# Patient Record
Sex: FEMALE | Race: OTHER | URBAN - METROPOLITAN AREA
[De-identification: names, ages, dates, MRNs, and addresses within clinical notes are randomized per-mention and may not be internally consistent; named-entity substitution may affect disease eponyms.]

---

## 2022-03-24 NOTE — PATIENT DISCUSSION
Recommended warm compresses twice daily and Lid scrubs.  Start on Maxitrol lynda qhs OS.  RTC 1 week sooner if s/s worsen or does not improve.

## 2022-03-30 NOTE — PATIENT DISCUSSION
Recommended warm compresses twice daily and Lid scrubs.  Start on Maxitrol lynda qhs OS.  Start on Z- pack as directed.  RTC 1 week sooner if s/s worsen or does not improve.

## 2022-12-12 ENCOUNTER — CONSULTATION/EVALUATION (OUTPATIENT)
Dept: URBAN - METROPOLITAN AREA CLINIC 20 | Facility: CLINIC | Age: 47
End: 2022-12-12

## 2022-12-12 PROCEDURE — 99499LK REFRACTIVE CONSULT/LASIK

## 2022-12-12 PROCEDURE — 76514 ECHO EXAM OF EYE THICKNESS: CPT

## 2022-12-12 PROCEDURE — 92025 CPTRIZED CORNEAL TOPOGRAPHY: CPT

## 2022-12-12 ASSESSMENT — PACHYMETRY
OD_CT_UM: 579
OS_CT_UM: 577

## 2022-12-12 ASSESSMENT — VISUAL ACUITY
OD_SC: 20/20
OU_CC: 20/20
OS_SC: 20/400
OU_SC: 20/400
OU_CC: 20/25
OS_CC: 20/25
OS_SC: 20/20
OD_CC: 20/30
OS_CC: 20/25
OD_CC: 20/20
OD_SC: 20/400
OU_SC: 20/20

## 2022-12-12 ASSESSMENT — KERATOMETRY
OS_AXISANGLE_DEGREES: 104
OD_K2POWER_DIOPTERS: 47.25
OS_AXISANGLE_DEGREES: 099
OD_AXISANGLE_DEGREES: 021
OS_AXISANGLE2_DEGREES: 14
OD_AXISANGLE_DEGREES: 001
OD_K1POWER_DIOPTERS: 46.75
OS_K2POWER_DIOPTERS: 47.75
OD_K1POWER_DIOPTERS: 47.25
OD_AXISANGLE2_DEGREES: 91
OS_AXISANGLE_DEGREES: 098
OD_AXISANGLE2_DEGREES: 111
OS_AXISANGLE2_DEGREES: 9
OS_K2POWER_DIOPTERS: 47.50
OD_K2POWER_DIOPTERS: 47.00
OS_K1POWER_DIOPTERS: 46.75
OS_K1POWER_DIOPTERS: 46.50
OS_AXISANGLE2_DEGREES: 8

## 2022-12-12 ASSESSMENT — TONOMETRY
OD_IOP_MMHG: 18
OS_IOP_MMHG: 15

## 2023-01-05 ENCOUNTER — POST-OP (OUTPATIENT)
Dept: URBAN - METROPOLITAN AREA CLINIC 20 | Facility: CLINIC | Age: 48
End: 2023-01-05

## 2023-01-05 DIAGNOSIS — Z98.890: ICD-10-CM

## 2023-01-05 PROCEDURE — 99024 POSTOP FOLLOW-UP VISIT: CPT

## 2023-01-05 ASSESSMENT — VISUAL ACUITY: OD_SC: 20/20

## 2023-01-05 ASSESSMENT — TONOMETRY
OS_IOP_MMHG: 16
OD_IOP_MMHG: 16

## 2023-01-12 ENCOUNTER — POST-OP (OUTPATIENT)
Dept: URBAN - METROPOLITAN AREA CLINIC 20 | Facility: CLINIC | Age: 48
End: 2023-01-12

## 2023-01-12 DIAGNOSIS — Z98.890: ICD-10-CM

## 2023-01-12 PROCEDURE — 99024 POSTOP FOLLOW-UP VISIT: CPT

## 2023-01-12 ASSESSMENT — VISUAL ACUITY
OD_SC: 20/20
OS_SC: 20/40
OU_SC: 20/20

## 2023-01-12 ASSESSMENT — TONOMETRY
OS_IOP_MMHG: 17
OD_IOP_MMHG: 17

## 2023-01-26 ENCOUNTER — POST-OP (OUTPATIENT)
Dept: URBAN - METROPOLITAN AREA CLINIC 20 | Facility: CLINIC | Age: 48
End: 2023-01-26

## 2023-01-26 DIAGNOSIS — Z98.890: ICD-10-CM

## 2023-01-26 PROCEDURE — 99024 POSTOP FOLLOW-UP VISIT: CPT

## 2023-01-26 ASSESSMENT — VISUAL ACUITY
OS_SC: 20/30+2
OU_SC: 20/20
OU_SC: J5
OD_SC: 20/20

## 2023-01-26 ASSESSMENT — TONOMETRY
OD_IOP_MMHG: 13
OS_IOP_MMHG: 12

## 2023-05-05 ENCOUNTER — POST-OP (OUTPATIENT)
Dept: URBAN - METROPOLITAN AREA CLINIC 20 | Facility: CLINIC | Age: 48
End: 2023-05-05

## 2023-05-05 DIAGNOSIS — H26.493: ICD-10-CM

## 2023-05-05 PROCEDURE — 92014 COMPRE OPH EXAM EST PT 1/>: CPT

## 2023-05-05 PROCEDURE — 66821 AFTER CATARACT LASER SURGERY: CPT

## 2023-05-05 RX ORDER — PREDNISOLONE ACETATE 10 MG/ML: 1 SUSPENSION/ DROPS OPHTHALMIC

## 2023-05-05 ASSESSMENT — TONOMETRY
OS_IOP_MMHG: 13
OD_IOP_MMHG: 13

## 2023-05-05 ASSESSMENT — VISUAL ACUITY
OU_SC: 20/20
OD_SC: 20/20
OS_SC: 20/30-2

## 2023-05-22 ENCOUNTER — POST-OP (OUTPATIENT)
Dept: URBAN - METROPOLITAN AREA CLINIC 20 | Facility: CLINIC | Age: 48
End: 2023-05-22

## 2023-05-22 DIAGNOSIS — Z98.890: ICD-10-CM

## 2023-05-22 PROCEDURE — 99024 POSTOP FOLLOW-UP VISIT: CPT

## 2023-05-22 ASSESSMENT — VISUAL ACUITY
OD_SC: 20/20
OS_SC: 20/40

## 2023-05-22 ASSESSMENT — TONOMETRY
OS_IOP_MMHG: 16
OD_IOP_MMHG: 17

## 2023-05-31 ENCOUNTER — POST-OP (OUTPATIENT)
Dept: URBAN - METROPOLITAN AREA CLINIC 20 | Facility: CLINIC | Age: 48
End: 2023-05-31

## 2023-05-31 ASSESSMENT — VISUAL ACUITY
OU_SC: 20/20
OS_SC: 20/20
OS_SC: 20/60
OU_SC: 20/20
OD_SC: 20/20
OD_SC: 20/25

## 2023-06-30 ENCOUNTER — POST-OP (OUTPATIENT)
Dept: URBAN - METROPOLITAN AREA CLINIC 20 | Facility: CLINIC | Age: 48
End: 2023-06-30

## 2023-06-30 DIAGNOSIS — H52.222: ICD-10-CM

## 2023-06-30 PROCEDURE — 99211T TECH SERVICE

## 2023-06-30 ASSESSMENT — VISUAL ACUITY
OD_SC: 20/20
OU_SC: 20/20
OU_SC: 20/20
OD_SC: 20/20
OS_SC: 20/30
OS_SC: 20/30

## 2023-07-13 ENCOUNTER — POST-OP (OUTPATIENT)
Dept: URBAN - METROPOLITAN AREA CLINIC 20 | Facility: CLINIC | Age: 48
End: 2023-07-13

## 2023-07-13 DIAGNOSIS — Z98.890: ICD-10-CM

## 2023-07-13 PROCEDURE — 99024 POSTOP FOLLOW-UP VISIT: CPT

## 2023-07-13 ASSESSMENT — VISUAL ACUITY: OS_SC: 20/80

## 2023-07-17 ENCOUNTER — POST-OP (OUTPATIENT)
Dept: URBAN - METROPOLITAN AREA CLINIC 20 | Facility: CLINIC | Age: 48
End: 2023-07-17

## 2023-07-17 DIAGNOSIS — Z98.890: ICD-10-CM

## 2023-07-17 PROCEDURE — 99024 POSTOP FOLLOW-UP VISIT: CPT

## 2023-07-17 ASSESSMENT — VISUAL ACUITY
OD_PH: 20/25-1
OD_SC: 20/40

## 2023-07-24 ENCOUNTER — POST-OP (OUTPATIENT)
Dept: URBAN - METROPOLITAN AREA CLINIC 20 | Facility: CLINIC | Age: 48
End: 2023-07-24

## 2023-07-24 DIAGNOSIS — Z98.890: ICD-10-CM

## 2023-07-24 PROCEDURE — 99024LK POST OP LASIK CARE ONLY

## 2023-07-24 ASSESSMENT — VISUAL ACUITY
OD_SC: 20/30
OD_SC: 20/20
OS_SC: 20/25
OS_SC: 20/200

## 2023-07-28 ENCOUNTER — POST-OP (OUTPATIENT)
Dept: URBAN - METROPOLITAN AREA CLINIC 20 | Facility: CLINIC | Age: 48
End: 2023-07-28

## 2023-07-28 DIAGNOSIS — Z98.890: ICD-10-CM

## 2023-07-28 PROCEDURE — 99024 POSTOP FOLLOW-UP VISIT: CPT

## 2023-07-28 ASSESSMENT — VISUAL ACUITY
OS_SC: 20/40
OD_SC: 20/30
OS_PH: 20/40
OS_SC: 20/50
OD_SC: 20/20

## 2023-08-18 ENCOUNTER — POST-OP (OUTPATIENT)
Dept: URBAN - METROPOLITAN AREA CLINIC 20 | Facility: CLINIC | Age: 48
End: 2023-08-18

## 2023-08-18 DIAGNOSIS — Z98.890: ICD-10-CM

## 2023-08-18 PROCEDURE — 99024LK POST OP LASIK CARE ONLY

## 2023-08-18 RX ORDER — PREDNISOLONE ACETATE 10 MG/ML: 1 SUSPENSION/ DROPS OPHTHALMIC

## 2023-08-18 ASSESSMENT — VISUAL ACUITY
OD_SC: 20/20
OS_SC: 20/30-2

## 2023-09-15 ENCOUNTER — POST-OP (OUTPATIENT)
Dept: URBAN - METROPOLITAN AREA CLINIC 20 | Facility: CLINIC | Age: 48
End: 2023-09-15

## 2023-09-15 DIAGNOSIS — Z98.890: ICD-10-CM

## 2023-09-15 PROCEDURE — 99024LK POST OP LASIK CARE ONLY

## 2023-09-15 ASSESSMENT — VISUAL ACUITY
OD_SC: 20/20
OS_SC: 20/40

## 2024-03-22 ENCOUNTER — POST-OP (OUTPATIENT)
Dept: URBAN - METROPOLITAN AREA CLINIC 20 | Facility: CLINIC | Age: 49
End: 2024-03-22

## 2024-03-22 DIAGNOSIS — Z98.890: ICD-10-CM

## 2024-03-22 RX ORDER — CYCLOSPORINE 0 MG/ML: 1 SOLUTION/ DROPS OPHTHALMIC; TOPICAL TWICE A DAY

## 2024-03-22 ASSESSMENT — KERATOMETRY
OS_AXISANGLE2_DEGREES: 10
OS_K2POWER_DIOPTERS: 47.50
OS_AXISANGLE_DEGREES: 100
OS_K1POWER_DIOPTERS: 46.50

## 2024-03-22 ASSESSMENT — VISUAL ACUITY
OU_SC: 20/20
OS_PH: 20/40+1
OU_SC: 20/20
OS_SC: 20/40-3

## 2024-03-22 ASSESSMENT — TONOMETRY: OS_IOP_MMHG: 12

## 2024-07-22 ENCOUNTER — POST-OP (OUTPATIENT)
Dept: URBAN - METROPOLITAN AREA CLINIC 20 | Facility: CLINIC | Age: 49
End: 2024-07-22

## 2024-07-22 DIAGNOSIS — Z98.890: ICD-10-CM

## 2024-07-22 ASSESSMENT — TONOMETRY
OD_IOP_MMHG: 13
OS_IOP_MMHG: 13

## 2024-07-22 ASSESSMENT — VISUAL ACUITY
OD_SC: 20/20
OS_SC: 20/40

## 2025-07-28 ENCOUNTER — COMPREHENSIVE EXAM (OUTPATIENT)
Age: 50
End: 2025-07-28

## 2025-07-28 DIAGNOSIS — H16.223: ICD-10-CM

## 2025-07-28 PROCEDURE — 99214 OFFICE O/P EST MOD 30 MIN: CPT
